# Patient Record
Sex: MALE | Race: WHITE | NOT HISPANIC OR LATINO | Employment: STUDENT | URBAN - NONMETROPOLITAN AREA
[De-identification: names, ages, dates, MRNs, and addresses within clinical notes are randomized per-mention and may not be internally consistent; named-entity substitution may affect disease eponyms.]

---
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
## 2024-05-29 ENCOUNTER — HOSPITAL ENCOUNTER (EMERGENCY)
Facility: HOSPITAL | Age: 19
Discharge: HOME | End: 2024-05-29
Attending: EMERGENCY MEDICINE | Admitting: EMERGENCY MEDICINE

## 2024-05-29 DIAGNOSIS — F43.21 GRIEF REACTION: ICD-10-CM

## 2024-05-29 PROCEDURE — 99282 EMERGENCY DEPT VISIT SF MDM: CPT | Performed by: EMERGENCY MEDICINE

## 2024-05-29 PROCEDURE — 99283 EMERGENCY DEPT VISIT LOW MDM: CPT

## 2024-05-29 RX ORDER — OLANZAPINE 5 MG/1
5 TABLET, ORALLY DISINTEGRATING ORAL ONCE
Status: DISCONTINUED | OUTPATIENT
Start: 2024-05-29 | End: 2024-05-29

## 2024-05-29 ASSESSMENT — ENCOUNTER SYMPTOMS
SHORTNESS OF BREATH: 0
FEVER: 0
CHILLS: 0

## 2024-05-29 ASSESSMENT — ACTIVITIES OF DAILY LIVING (ADL): ADLS_ACUITY_SCORE: 35

## 2024-05-29 NOTE — ED NOTES
Provider in to talk with patient and provider then walks with patient out to waiting room as his uncle is out there and provider states that he is discharging patient with his uncle. Provider goes out and talks with all and then verbally discharges patient.  No vital signs obtained as patient initially refused upon arrival and then was discharged by provider.
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
## 2024-10-23 ENCOUNTER — HOSPITAL ENCOUNTER (EMERGENCY)
Facility: HOSPITAL | Age: 19
Discharge: HOME | End: 2024-10-23
Attending: NURSE PRACTITIONER | Admitting: NURSE PRACTITIONER

## 2024-10-23 VITALS
OXYGEN SATURATION: 97 % | WEIGHT: 247 LBS | RESPIRATION RATE: 16 BRPM | DIASTOLIC BLOOD PRESSURE: 80 MMHG | HEART RATE: 97 BPM | SYSTOLIC BLOOD PRESSURE: 139 MMHG | TEMPERATURE: 98.2 F | HEIGHT: 68 IN | BODY MASS INDEX: 37.44 KG/M2

## 2024-10-23 DIAGNOSIS — J01.90 ACUTE SINUSITIS WITH SYMPTOMS > 10 DAYS: Primary | ICD-10-CM

## 2024-10-23 LAB
FLUAV RNA SPEC QL NAA+PROBE: NEGATIVE
FLUBV RNA RESP QL NAA+PROBE: NEGATIVE
GROUP A STREP BY PCR: NOT DETECTED
RSV RNA SPEC NAA+PROBE: NEGATIVE
SARS-COV-2 RNA RESP QL NAA+PROBE: NEGATIVE

## 2024-10-23 PROCEDURE — 87637 SARSCOV2&INF A&B&RSV AMP PRB: CPT | Performed by: NURSE PRACTITIONER

## 2024-10-23 PROCEDURE — 87651 STREP A DNA AMP PROBE: CPT | Performed by: NURSE PRACTITIONER

## 2024-10-23 PROCEDURE — 99213 OFFICE O/P EST LOW 20 MIN: CPT | Performed by: NURSE PRACTITIONER

## 2024-10-23 PROCEDURE — G0463 HOSPITAL OUTPT CLINIC VISIT: HCPCS

## 2024-10-23 ASSESSMENT — ENCOUNTER SYMPTOMS
VOMITING: 1
NECK PAIN: 0
NECK STIFFNESS: 0
PSYCHIATRIC NEGATIVE: 1
SHORTNESS OF BREATH: 0
EYE REDNESS: 0
FEVER: 0
SORE THROAT: 1
RHINORRHEA: 1
CHILLS: 1
EYE DISCHARGE: 0
NAUSEA: 0
TROUBLE SWALLOWING: 0
HEADACHES: 0
DIARRHEA: 0
MYALGIAS: 0
COUGH: 1
ABDOMINAL PAIN: 0

## 2024-10-23 ASSESSMENT — ACTIVITIES OF DAILY LIVING (ADL): ADLS_ACUITY_SCORE: 0

## 2024-10-23 NOTE — DISCHARGE INSTRUCTIONS
Augmentin as ordered  - Take entire course of antibiotic even if you start to feel better.  - Antibiotics can cause stomach upset including nausea and diarrhea. Read your bottle or ask the pharmacist if antibiotic can be taken with food to help prevent nausea. If you have symptoms of diarrhea you can take an over-the-counter probiotic and/or increase foods with probiotics such as yogurt, [place], sauerkraut.    OTC Flonase as ordered    Push fluids    Follow-up with primary care provider or return to urgent care/ED with any worsening in condition or additional concerns.

## 2024-10-23 NOTE — Clinical Note
Vijay Cuenca was seen and treated in our emergency department on 10/23/2024.    Please excuse from work on 10/22/2024-10/24/2024.  Thank you      Sincerely,     [facility]

## 2024-10-23 NOTE — ED TRIAGE NOTES
Pt presents with cough and congestion x 2 weeks. Pt stated unsure for fever, yes chills and sweats, decreased appetite, fatigued, body aches, no change with urine, ear pain comes and goes with left ear, has nausea and vomiting and has a sore throat. Pt went to ER in [place] was told bronchitis and signs of pneumonia. Was told to take tylenol. PT stated symptoms have worsened. Pt stated he has not been taking tylenol at all but has been taking cough drops.

## 2024-10-23 NOTE — ED PROVIDER NOTES
History     Chief Complaint   Patient presents with    Cough    Nasal Congestion     HPI  Vijay Cuenca is a 18 year old male who presents to urgent care today ambulatory with complaints of chills, congestion, ear pain, rhinorrhea, sore throat, vomiting and cough.  Symptoms started 2 weeks ago.  Patient was seen on 10/19/2024 at [facility] and was diagnosed with bronchitis and told to take APAP.  Denies any fever, nausea, diarrhea, shortness of breath or chest pain.  Denies any abdominal pain.  Patient states vomiting has mostly subsided.  No rashes.  No neck pain or stiffness.  No other concerns.    Allergies:  No Known Allergies    Problem List:    Patient Active Problem List    Diagnosis Date Noted    Chronic left shoulder pain 07/27/2022     Priority: Medium    Sleeping difficulty 07/27/2022     Priority: Medium    Child in foster care 04/24/2019     Priority: Medium    Controlled substance agreement signed 12/22/2014     Priority: Medium     Overview:   Updated . Meghana Miranda MD ....................  11/22/2016   12:22 PM       ADHD, predominantly inattentive type 11/24/2014     Priority: Medium    Depression 11/24/2014     Priority: Medium     Overview:   DA done 12/2013, Dr. Levar Duenas. Meghana Miranda MD ....................  11/24/2014   2:01 PM       Oppositional defiant disorder 11/24/2014     Priority: Medium     Overview:   DA performed by Dr. Levar Duenas, 12/2013 Meghana Miranda MD ....................  11/24/2014   2:01 PM           Past Medical History:    Past Medical History:   Diagnosis Date    Attention-deficit hyperactivity disorder, predominantly inattentive type     Erysipelas     Major depressive disorder, single episode        Past Surgical History:    Past Surgical History:   Procedure Laterality Date    OTHER SURGICAL HISTORY      [id number],NO PREVIOUS SURGERY       Family History:    Family History   Problem Relation Age of Onset    Diabetes Mother        Social  "History:  Marital Status:  Single [1]  Social History     Tobacco Use    Smoking status: Never    Smokeless tobacco: Never   Vaping Use    Vaping status: Some Days    Substances: Nicotine, Flavoring    Devices: Refillable tank   Substance Use Topics    Alcohol use: Never     Alcohol/week: 0.0 standard drinks of alcohol    Drug use: Never        Medications:    amoxicillin-clavulanate (AUGMENTIN) 875-125 MG tablet      Review of Systems   Constitutional:  Positive for chills. Negative for fever.   HENT:  Positive for congestion, ear pain, rhinorrhea and sore throat. Negative for trouble swallowing.    Eyes:  Negative for discharge and redness.   Respiratory:  Positive for cough. Negative for shortness of breath.    Cardiovascular:  Negative for chest pain.   Gastrointestinal:  Positive for vomiting (improving). Negative for abdominal pain, diarrhea and nausea.   Musculoskeletal:  Negative for gait problem, myalgias, neck pain and neck stiffness.   Skin:  Negative for rash.   Neurological:  Negative for headaches.   Psychiatric/Behavioral: Negative.       Physical Exam   BP: 139/80  Pulse: 97  Temp: 98.2  F (36.8  C)  Resp: 16  Height: 172.7 cm (5' 8\")  Weight: 112 kg (247 lb)  SpO2: 97 %    Physical Exam  Vitals and nursing note reviewed.   Constitutional:       General: He is not in acute distress.     Appearance: Normal appearance. He is not ill-appearing or toxic-appearing.   HENT:      Right Ear: Tympanic membrane, ear canal and external ear normal.      Left Ear: Tympanic membrane, ear canal and external ear normal.      Nose: Congestion and rhinorrhea present.      Right Sinus: Maxillary sinus tenderness present. No frontal sinus tenderness.      Left Sinus: Maxillary sinus tenderness present. No frontal sinus tenderness.      Mouth/Throat:      Mouth: Mucous membranes are moist.      Pharynx: Oropharynx is clear. No oropharyngeal exudate or posterior oropharyngeal erythema.   Cardiovascular:      Rate and " Rhythm: Normal rate and regular rhythm.      Pulses: Normal pulses.      Heart sounds: Normal heart sounds.   Pulmonary:      Effort: Pulmonary effort is normal.      Breath sounds: Normal breath sounds.   Abdominal:      General: Bowel sounds are normal.      Palpations: Abdomen is soft.      Tenderness: There is no abdominal tenderness.   Musculoskeletal:      Cervical back: Normal range of motion and neck supple. No rigidity or tenderness.   Lymphadenopathy:      Cervical: No cervical adenopathy.   Skin:     General: Skin is warm and dry.      Capillary Refill: Capillary refill takes less than 2 seconds.   Neurological:      Mental Status: He is alert.   Psychiatric:         Mood and Affect: Mood normal.       ED Course     Procedures    No results found for this or any previous visit (from the past 24 hours).    Medications - No data to display    Assessments & Plan (with Medical Decision Making)     I have reviewed the nursing notes.    I have reviewed the findings, diagnosis, plan and need for follow up with the patient.  (J01.90) Acute sinusitis with symptoms > 10 days  (primary encounter diagnosis)  Plan:   Patient ambulatory with a nontoxic appearance.  Lungs clear throughout, no shortness of breath or chest pain.  No signs of otitis media.  No throat erythema.  Moderate congestion, maxillary sinus tenderness present.  Sinusitis ongoing for 2 weeks.  No rashes.  No abdominal pain or tenderness.  Staying hydrated.  Will treat ongoing sinusitis with Augmentin.  Recommend over-the-counter Flonase for congestion.  Push fluids.  Follow-up with primary care provider or return to urgent care/ED with any worsening in condition or additional concerns.  Patient in agreement treatment plan.    Discharge Medication List as of 10/23/2024  3:16 PM        START taking these medications    Details   amoxicillin-clavulanate (AUGMENTIN) 875-125 MG tablet Take 1 tablet by mouth 2 times daily for 10 days., Disp-20 tablet, R-0,  E-Prescribe           Final diagnoses:   Acute sinusitis with symptoms > 10 days     10/23/2024   [facility]       Bess Yuen, MAXWELL  10/23/24 1528

## 2024-12-21 ENCOUNTER — APPOINTMENT (OUTPATIENT)
Dept: GENERAL RADIOLOGY | Facility: HOSPITAL | Age: 19
End: 2024-12-21
Attending: NURSE PRACTITIONER
Payer: MEDICAID

## 2024-12-21 ENCOUNTER — HOSPITAL ENCOUNTER (EMERGENCY)
Facility: HOSPITAL | Age: 19
Discharge: HOME | End: 2024-12-21
Attending: NURSE PRACTITIONER
Payer: MEDICAID

## 2024-12-21 VITALS
RESPIRATION RATE: 16 BRPM | HEART RATE: 99 BPM | SYSTOLIC BLOOD PRESSURE: 139 MMHG | TEMPERATURE: 99.4 F | DIASTOLIC BLOOD PRESSURE: 84 MMHG | OXYGEN SATURATION: 96 % | BODY MASS INDEX: 36.59 KG/M2 | WEIGHT: 240.63 LBS

## 2024-12-21 DIAGNOSIS — J10.1 INFLUENZA A: ICD-10-CM

## 2024-12-21 LAB
FLUAV RNA SPEC QL NAA+PROBE: POSITIVE
FLUBV RNA RESP QL NAA+PROBE: NEGATIVE
RSV RNA SPEC NAA+PROBE: NEGATIVE
SARS-COV-2 RNA RESP QL NAA+PROBE: NEGATIVE

## 2024-12-21 PROCEDURE — 99284 EMERGENCY DEPT VISIT MOD MDM: CPT | Performed by: NURSE PRACTITIONER

## 2024-12-21 PROCEDURE — 71046 X-RAY EXAM CHEST 2 VIEWS: CPT

## 2024-12-21 PROCEDURE — 87637 SARSCOV2&INF A&B&RSV AMP PRB: CPT | Performed by: NURSE PRACTITIONER

## 2024-12-21 PROCEDURE — 99284 EMERGENCY DEPT VISIT MOD MDM: CPT | Mod: 25

## 2024-12-21 RX ORDER — ONDANSETRON 4 MG/1
4-8 TABLET, FILM COATED ORAL EVERY 8 HOURS PRN
Qty: 20 TABLET | Refills: 0 | Status: SHIPPED | OUTPATIENT
Start: 2024-12-21

## 2024-12-21 ASSESSMENT — ENCOUNTER SYMPTOMS
ALLERGIC/IMMUNOLOGIC NEGATIVE: 1
EYES NEGATIVE: 1
CONSTIPATION: 0
MYALGIAS: 1
ABDOMINAL PAIN: 0
DIARRHEA: 1
ENDOCRINE NEGATIVE: 1
HEMATOLOGIC/LYMPHATIC NEGATIVE: 1
BLOOD IN STOOL: 0
COUGH: 1
FEVER: 1
VOMITING: 1
FATIGUE: 1
PSYCHIATRIC NEGATIVE: 1
CARDIOVASCULAR NEGATIVE: 1
NEUROLOGICAL NEGATIVE: 1
NAUSEA: 1
CHILLS: 1

## 2024-12-21 ASSESSMENT — ACTIVITIES OF DAILY LIVING (ADL): ADLS_ACUITY_SCORE: 41

## 2024-12-21 ASSESSMENT — COLUMBIA-SUICIDE SEVERITY RATING SCALE - C-SSRS
6. HAVE YOU EVER DONE ANYTHING, STARTED TO DO ANYTHING, OR PREPARED TO DO ANYTHING TO END YOUR LIFE?: NO
1. IN THE PAST MONTH, HAVE YOU WISHED YOU WERE DEAD OR WISHED YOU COULD GO TO SLEEP AND NOT WAKE UP?: NO
2. HAVE YOU ACTUALLY HAD ANY THOUGHTS OF KILLING YOURSELF IN THE PAST MONTH?: NO

## 2024-12-21 NOTE — ED PROVIDER NOTES
History     Chief Complaint   Patient presents with    Cough    Nausea, Vomiting, & Diarrhea     HPI  Vijay Cuenca is a 19 year old individual with history of ADHD, depression, oppositional defiant disorder, comes in for nausea, vomiting, diarrhea for the past 4 days along with bodyaches, fever, and chills.  Patient states has been having the symptoms for the past 4 days.  Body aches.  Has been able to drink some but has been having nausea.  No melena or hematochezia reported.  No blood in emesis reported.  Has been coughing but no shortness of breath.  Small amounts of yellow sputum reported.    Allergies:  No Known Allergies    Problem List:    Patient Active Problem List    Diagnosis Date Noted    Chronic left shoulder pain 07/27/2022     Priority: Medium    Sleeping difficulty 07/27/2022     Priority: Medium    Child in foster care 04/24/2019     Priority: Medium    Controlled substance agreement signed 12/22/2014     Priority: Medium     Overview:   Updated . Meghana Miranda MD ....................  11/22/2016   12:22 PM       ADHD, predominantly inattentive type 11/24/2014     Priority: Medium    Depression 11/24/2014     Priority: Medium     Overview:   DA done 12/2013, Dr. Levar Duenas. Meghana Miranda MD ....................  11/24/2014   2:01 PM       Oppositional defiant disorder 11/24/2014     Priority: Medium     Overview:   DA performed by Dr. Levar Duenas, 12/2013 Meghana Miranda MD ....................  11/24/2014   2:01 PM           Past Medical History:    Past Medical History:   Diagnosis Date    Attention-deficit hyperactivity disorder, predominantly inattentive type     Erysipelas     Major depressive disorder, single episode        Past Surgical History:    Past Surgical History:   Procedure Laterality Date    OTHER SURGICAL HISTORY      [id number],NO PREVIOUS SURGERY       Family History:    Family History   Problem Relation Age of Onset    Diabetes Mother        Social History:  Marital  Status:  Single [1]  Social History     Tobacco Use    Smoking status: Never    Smokeless tobacco: Never   Vaping Use    Vaping status: Some Days    Substances: Nicotine, Flavoring    Devices: Refillable tank   Substance Use Topics    Alcohol use: Never     Alcohol/week: 0.0 standard drinks of alcohol    Drug use: Never        Medications:    ondansetron (ZOFRAN) 4 MG tablet          Review of Systems   Constitutional:  Positive for chills, fatigue and fever.   HENT: Negative.     Eyes: Negative.    Respiratory:  Positive for cough.    Cardiovascular: Negative.    Gastrointestinal:  Positive for diarrhea, nausea and vomiting. Negative for abdominal pain, blood in stool and constipation.   Endocrine: Negative.    Genitourinary: Negative.    Musculoskeletal:  Positive for myalgias.   Skin: Negative.    Allergic/Immunologic: Negative.    Neurological: Negative.    Hematological: Negative.    Psychiatric/Behavioral: Negative.         Physical Exam   BP: 139/84  Pulse: 99  Temp: 99.4  F (37.4  C)  Resp: 16  Weight: 109.1 kg (240 lb 10.1 oz)  SpO2: 96 %      GENERAL APPEARANCE:  The patient is a 19 year old well-developed, well-nourished individual that appears as stated age.  LUNGS:  Breathing is easy.  Breath sounds are equal and clear bilaterally.  No wheezes, rhonchi, or rales.  HEART:  Regular rate and rhythm with normal S1 and S2.  No murmurs, gallops, or rubs.  ABDOMEN:  Soft.  No mass, tenderness, guarding, or rebound.  No organomegaly or hernia.  Bowel sounds are present.  No CVA tenderness or flank mass.  No abdominal bruits or thrills present upon auscultation/palpation.  PSYCHIATRIC:  The patient is awake, alert, and oriented x4.  Recent and remote memory is intact.  Appropriate mood and affect.  Calm and cooperative with history and physical exam.  SKIN:  Warm, dry, and well perfused.  Good turgor.  No lesions, nodules, or rashes are noted.  No bruising noted.      ED Course     ED Course as of 12/21/24 5742    Sat Dec 21, 2024   1120 Labs and x-ray ordered.   1212 Influenza A(!): Positive  Patient positive for influenza A.   1231 In to see patient and history/physical completed.    1241 At this time vital signs reassuring and patient in no distress.  Will discharge home on ondansetron.  Significant/ibuprofen for fever and bodyaches.  Return precautions given.                 Results for orders placed or performed during the hospital encounter of 12/21/24 (from the past 24 hours)   Influenza A/B, RSV and SARS-CoV2 PCR (COVID-19) Nasopharyngeal    Specimen: Nasopharyngeal; Swab   Result Value Ref Range    Influenza A PCR Positive (A) Negative    Influenza B PCR Negative Negative    RSV PCR Negative Negative    SARS CoV2 PCR Negative Negative    Narrative    Testing was performed using the Xpert Xpress CoV2/Flu/RSV Assay on the Cepheid GeneXpert Instrument. This test should be ordered for the detection of SARS-CoV2, influenza, and RSV viruses in individuals with signs and symptoms of respiratory tract infection. This test is for in vitro diagnostic use under the US FDA for laboratories certified under CLIA to perform high or moderate complexity testing. This test has been [place] FDA cleared. A negative result does not rule out the presence of PCR inhibitors in the specimen or target RNA in concentration below the limit of detection for the assay. If only one viral target is positive but coinfection with multiple targets is suspected, the sample should be re-tested with another FDA cleared, approved, or authorized test, if coninfection would change clinical management. This test was validated by the [facility]. These laboratories are certified under the Clinical Laboratory Improvement Amendments of 1988 (CLIA-88) as qualified to perfom high complexity laboratory testing.   Chest XR,  PA & LAT    Narrative    EXAM: XR CHEST 2 VIEWS  LOCATION: [facility]  DATE: 12/21/2024    INDICATION: productive  cough, sob x4 days, fevers  COMPARISON: None.      Impression    IMPRESSION: Negative chest.       Medications - No data to display    Assessments & Plan (with Medical Decision Making)     I have reviewed the nursing notes.    I have reviewed the findings, diagnosis, plan and need for follow up with the patient.    Summary:  Patient presents to the ER today for nausea, vomiting, diarrhea.  Potential diagnosis which have been considered and evaluated include gastroenteritis, influenza, COVID, RSV, bowel obstruction, ureteral stone, sepsis, pyelonephritis, pneumonia, as well as others. Many of these have been excluded using the various modalities and assessment as noted on the chart. At the present time, the diagnosis given seems to be the most likely influenza A.  Upon arrival, vitals signs are normal.  The patient is alert and oriented.  Cardiac and respiratory examination normal.  No abdominal abnormalities noted.  [facility]plex was conducted while patient in [facility] showing positive influenza A.  Chest x-ray personally reviewed showing no acute cardiopulmonary abnormalities.  At this time physical examination benign.  Vital signs reassuring.  Patient is able to tolerate oral fluids.  Will discharge home to do ondansetron and continue oral fluids.  Acetaminophen/ibuprofen for fever and bodyaches.  Follow-up with PCP as needed and return to ER if new or worsening symptoms.  Patient verbalized understanding Riese plan of care.  Patient discharged home.        Critical Care Time: None    Impression and plan discussed with patient. Questions answered, concerns addressed, indications for urgent re-evaluation reviewed, and  given. Patient/Parent/Caregiver agree with treatment plan and have no further questions at this time.  AVS deferred at discharge.    This document was prepared using a combination of typing and voice generated software.  While every attempt was made for accuracy, spelling and grammatical errors may  exist.              Discharge Medication List as of 12/21/2024 12:38 PM        START taking these medications    Details   ondansetron (ZOFRAN) 4 MG tablet Take 1-2 tablets (4-8 mg) by mouth every 8 hours as needed for nausea., Disp-20 tablet, R-0, E-Prescribe             Final diagnoses:   Influenza A       12/21/2024   [facility]       Constantino Persaud, APRN CNP  12/21/24 1246

## 2024-12-21 NOTE — DISCHARGE INSTRUCTIONS
Influenza:    The flu (influenza) is a viral infection that affects your respiratory tract. The respiratory tract is made up of your mouth, nose, and lungs, and the passages between them. Unlike a cold, the flu can make you very ill. It may lead to pneumonia, a serious lung infection. The flu can have serious complications and even cause death.   Because of the COVID-19 pandemic, experts strongly advise getting a flu vaccine during 2913-2075 to protect yourself, your family, and others. Flu vaccines and COVID-19 vaccines can be given at the same time. Flu viruses and the COVID-19 virus are both likely to spread during flu season. A flu vaccine will help save medical resources to care for people with COVID-19. People at high risk for complications from the flu are also likely to be at high risk for serious problems from COVID-19, so it's important to get a flu vaccine.        Viruses that cause influenza spread through the air in droplets when someone who has the flu coughs, sneezes, laughs, or talks.      How does the flu spread?  The flu is caused by a virus. The virus spreads through the air in droplets when someone who has the flu coughs, sneezes, laughs, or talks. You can become infected when you inhale these viruses directly. You can also become infected when you touch a surface on which the droplets have landed and then transfer the germs to your eyes, nose, or mouth. Touching used tissues, or sharing utensils, drinking glasses, or a toothbrush from an infected person can expose you to flu viruses, too.     What are the symptoms of the flu?  Flu symptoms tend to come on quickly and may last a few days to a few weeks. They include:   Fever usually higher than 100.4  F  ( 38 C ) and chills  Sore throat and headache  Dry cough  Runny nose  Tiredness and weakness  Muscle aches    How is the flu treated?  The flu usually gets better after 7 days or so. In some cases, your healthcare provider may prescribe an  antiviral medicine. This may help you get well sooner. It also may reduce the risk for and severity of complications. For the medicine to help, you need to take it as soon as possible (ideally within 48 hours) after your symptoms start.   If you develop pneumonia or other serious illness, you may need to stay in the hospital.      Easing flu symptoms  During this time it is important to keep well hydrated with water, juices, or low calorie sports drinks.  Using 1/2 strength G2, Gatorade Zero, or PowerAde Zero is best choice (mix half and half with water).  DO NOT use caffeinated or alcoholic beverages for hydration, as these actually dehydrate you.   If your past medical conditions, allergies, current medications, or current status does not prevent you from using acetaminophen and/or ibuprofen, use the following: Acetaminophen (Tylenol) every 6 hours as needed for pain in addition to ibuprofen (Motrin) mg every 6 hours as needed for pain.  Take these two medications together if wanted.              Taking steps to protect others  Wash your hands often, especially after coughing or sneezing. Or clean your hands with an alcohol-based hand  containing at least 60% alcohol.  Cough or sneeze into a tissue. Then throw the tissue away and wash your hands. If you don t have a tissue, cough and sneeze into your elbow.  Stay home until at least 24 hours after you no longer have a fever or chills. Be sure the fever isn t being hidden by fever-reducing medicine.  Don t share food, utensils, drinking glasses, or a toothbrush with others.